# Patient Record
Sex: MALE | Race: WHITE
[De-identification: names, ages, dates, MRNs, and addresses within clinical notes are randomized per-mention and may not be internally consistent; named-entity substitution may affect disease eponyms.]

---

## 2018-04-13 ENCOUNTER — HOSPITAL ENCOUNTER (INPATIENT)
Dept: HOSPITAL 47 - EC | Age: 28
LOS: 10 days | Discharge: HOME | DRG: 885 | End: 2018-04-23
Attending: PSYCHIATRY & NEUROLOGY | Admitting: PSYCHIATRY & NEUROLOGY
Payer: MEDICARE

## 2018-04-13 DIAGNOSIS — R94.6: ICD-10-CM

## 2018-04-13 DIAGNOSIS — F25.1: Primary | ICD-10-CM

## 2018-04-13 DIAGNOSIS — R94.5: ICD-10-CM

## 2018-04-13 DIAGNOSIS — M41.9: ICD-10-CM

## 2018-04-13 DIAGNOSIS — G47.00: ICD-10-CM

## 2018-04-13 DIAGNOSIS — F43.22: ICD-10-CM

## 2018-04-13 DIAGNOSIS — F17.200: ICD-10-CM

## 2018-04-13 DIAGNOSIS — Z79.899: ICD-10-CM

## 2018-04-13 DIAGNOSIS — Z91.19: ICD-10-CM

## 2018-04-13 LAB
BILIRUB UR QL STRIP.AUTO: (no result)
HYALINE CASTS UR QL AUTO: 2 /LPF (ref 0–2)
KETONES UR QL STRIP.AUTO: (no result)
PH UR: 5.5 [PH] (ref 5–8)
PROT UR QL: (no result)
RBC UR QL: 1 /HPF (ref 0–5)
SP GR UR: 1.03 (ref 1–1.03)
UROBILINOGEN UR QL STRIP: 3 MG/DL (ref ?–2)
WBC #/AREA URNS HPF: 1 /HPF (ref 0–5)

## 2018-04-13 PROCEDURE — 80053 COMPREHEN METABOLIC PANEL: CPT

## 2018-04-13 PROCEDURE — 80306 DRUG TEST PRSMV INSTRMNT: CPT

## 2018-04-13 PROCEDURE — 84439 ASSAY OF FREE THYROXINE: CPT

## 2018-04-13 PROCEDURE — 81001 URINALYSIS AUTO W/SCOPE: CPT

## 2018-04-13 PROCEDURE — 99285 EMERGENCY DEPT VISIT HI MDM: CPT

## 2018-04-13 PROCEDURE — 85025 COMPLETE CBC W/AUTO DIFF WBC: CPT

## 2018-04-13 PROCEDURE — 83036 HEMOGLOBIN GLYCOSYLATED A1C: CPT

## 2018-04-13 PROCEDURE — 80061 LIPID PANEL: CPT

## 2018-04-13 PROCEDURE — 82075 ASSAY OF BREATH ETHANOL: CPT

## 2018-04-13 PROCEDURE — 84443 ASSAY THYROID STIM HORMONE: CPT

## 2018-04-13 RX ADMIN — NICOTINE SCH PATCH: 14 PATCH, EXTENDED RELEASE TRANSDERMAL at 18:10

## 2018-04-13 NOTE — ED
General Adult HPI





- General


Chief complaint: Psychiatric Symptoms


Stated complaint: PETITIONED


Time Seen by Provider: 04/13/18 13:22


Source: patient, police, RN notes reviewed, old records reviewed


Mode of arrival: ambulatory


Limitations: no limitations





- History of Present Illness


Initial comments: 





This is a 19-year-old male the ER for evaluation.  Patient is sent in for 

evaluation under court order for psychiatric evaluation.  Patient is a poor 

historian





- Related Data


 Home Medications











 Medication  Instructions  Recorded  Confirmed


 


ARIPiprazole [Abilify] 10 mg PO DAILY 04/13/18 04/13/18


 


Pyridoxine [Vitamin B-6] 50 mg PO DAILY 04/13/18 04/13/18











 Allergies











Allergy/AdvReac Type Severity Reaction Status Date / Time


 


No Known Allergies Allergy   Verified 04/13/18 13:44














Review of Systems


ROS Statement: 


Those systems with pertinent positive or pertinent negative responses have been 

documented in the HPI.





ROS Other: All systems not noted in ROS Statement are negative.





Past Medical History


Past Medical History: Memory Impairment


Additional Past Medical History / Comment(s): psychosis, alt mental status, 

scoliosis


History of Any Multi-Drug Resistant Organisms: None Reported


Past Surgical History: No Surgical Hx Reported


Past Anesthesia/Blood Transfusion Reactions: No Reported Reaction


Past Psychological History: Anxiety, Schizoaffective Disorder


Smoking Status: Current every day smoker


Past Alcohol Use History: Occasional


Past Drug Use History: None Reported





General Exam


Limitations: no limitations


General appearance: alert, in no apparent distress


Head exam: Present: atraumatic, normocephalic, normal inspection


Eye exam: Present: normal appearance, PERRL, EOMI.  Absent: scleral icterus, 

conjunctival injection, periorbital swelling


ENT exam: Present: normal exam, mucous membranes moist


Neck exam: Present: normal inspection.  Absent: tenderness, meningismus, 

lymphadenopathy


Respiratory exam: Present: normal lung sounds bilaterally.  Absent: respiratory 

distress, wheezes, rales, rhonchi, stridor


Cardiovascular Exam: Present: normal rhythm, tachycardia, normal heart sounds.  

Absent: systolic murmur, diastolic murmur, rubs, gallop, clicks


GI/Abdominal exam: Present: soft, normal bowel sounds.  Absent: distended, 

tenderness, guarding, rebound, rigid


Extremities exam: Present: normal inspection, full ROM, normal capillary 

refill.  Absent: tenderness, pedal edema, joint swelling, calf tenderness


Back exam: Present: normal inspection


Neurological exam: Present: alert, oriented X3, CN II-XII intact


Psychiatric exam: Present: normal affect, normal mood


Skin exam: Present: warm, dry, intact, normal color.  Absent: rash





Course


 Vital Signs











  04/13/18 04/13/18





  13:27 15:32


 


Temperature 98.5 F 


 


Pulse Rate 120 H 100


 


Respiratory 18 18





Rate  


 


Blood Pressure 142/87 131/66


 


O2 Sat by Pulse 98 98





Oximetry  














- Reevaluation(s)


Reevaluation #1: 





04/13/18 14:26


Medically clear for psychiatric evaluation





Medical Decision Making





- Medical Decision Making





19 female seen and evaluated with psychiatry will admit for psychiatric 

evaluation and treatment





- Lab Data


 Lab Results











  04/13/18 04/13/18 Range/Units





  13:38 13:38 


 


Urine Color   Yellow  


 


Urine Appearance   Clear  (Clear)  


 


Urine pH   5.5  (5.0-8.0)  


 


Ur Specific Gravity   1.031  (1.001-1.035)  


 


Urine Protein   1+ H  (Negative)  


 


Urine Glucose (UA)   Negative  (Negative)  


 


Urine Ketones   4+ H  (Negative)  


 


Urine Blood   Negative  (Negative)  


 


Urine Nitrite   Negative  (Negative)  


 


Urine Bilirubin   1+ H  (Negative)  


 


Urine Urobilinogen   3.0  (<2.0)  mg/dL


 


Ur Leukocyte Esterase   Negative  (Negative)  


 


Urine RBC   1  (0-5)  /hpf


 


Urine WBC   1  (0-5)  /hpf


 


Urine Bacteria   Rare H  (None)  /hpf


 


Hyaline Casts   2  (0-2)  /lpf


 


Urine Mucus   Many H  (None)  /hpf


 


Urine Opiates Screen  Not Detected   (NotDetected)  


 


Ur Oxycodone Screen  Not Detected   (NotDetected)  


 


Urine Methadone Screen  Not Detected   (NotDetected)  


 


Ur Propoxyphene Screen  Not Detected   (NotDetected)  


 


Ur Barbiturates Screen  Not Detected   (NotDetected)  


 


U Tricyclic Antidepress  Not Detected   (NotDetected)  


 


Ur Phencyclidine Scrn  Not Detected   (NotDetected)  


 


Ur Amphetamines Screen  Not Detected   (NotDetected)  


 


U Methamphetamines Scrn  Not Detected   (NotDetected)  


 


U Benzodiazepines Scrn  Not Detected   (NotDetected)  


 


Urine Cocaine Screen  Not Detected   (NotDetected)  


 


U Marijuana (THC) Screen  Not Detected   (NotDetected)  














Disposition


Clinical Impression: 


 Acute anxiety, Depression, Adjustment reaction, Chronic schizophrenia





Disposition: TRANSFER TO PSYCH HOSP/UNIT


Condition: Fair

## 2018-04-14 LAB
ALBUMIN SERPL-MCNC: 5 G/DL (ref 3.5–5)
ALP SERPL-CCNC: 56 U/L (ref 38–126)
ALT SERPL-CCNC: 36 U/L (ref 21–72)
ANION GAP SERPL CALC-SCNC: 15 MMOL/L
AST SERPL-CCNC: 42 U/L (ref 17–59)
BASOPHILS # BLD MANUAL: 0.08 K/UL (ref 0–0.2)
BUN SERPL-SCNC: 11 MG/DL (ref 9–20)
CALCIUM SPEC-MCNC: 9.8 MG/DL (ref 8.4–10.2)
CELLS COUNTED: 100
CHLORIDE SERPL-SCNC: 96 MMOL/L (ref 98–107)
CHOLEST SERPL-MCNC: 141 MG/DL (ref ?–200)
CO2 SERPL-SCNC: 29 MMOL/L (ref 22–30)
EOSINOPHIL # BLD MANUAL: 0.38 K/UL (ref 0–0.7)
ERYTHROCYTE [DISTWIDTH] IN BLOOD BY AUTOMATED COUNT: 5.62 M/UL (ref 4.3–5.9)
ERYTHROCYTE [DISTWIDTH] IN BLOOD: 11.7 % (ref 11.5–15.5)
GLUCOSE SERPL-MCNC: 104 MG/DL (ref 74–99)
HBA1C MFR BLD: 4.7 % (ref 4–6)
HCT VFR BLD AUTO: 46.8 % (ref 39–53)
HDLC SERPL-MCNC: 52 MG/DL (ref 40–60)
HGB BLD-MCNC: 16.7 GM/DL (ref 13–17.5)
LDLC SERPL CALC-MCNC: 76 MG/DL (ref 0–99)
LYMPHOCYTES # BLD MANUAL: 1.75 K/UL (ref 1–4.8)
MCH RBC QN AUTO: 29.7 PG (ref 25–35)
MCHC RBC AUTO-ENTMCNC: 35.7 G/DL (ref 31–37)
MCV RBC AUTO: 83.2 FL (ref 80–100)
MONOCYTES # BLD MANUAL: 0.68 K/UL (ref 0–1)
NEUTROPHILS NFR BLD MANUAL: 62 %
NEUTS SEG # BLD MANUAL: 4.71 K/UL (ref 1.3–7.7)
PLATELET # BLD AUTO: 202 K/UL (ref 150–450)
POTASSIUM SERPL-SCNC: 3.5 MMOL/L (ref 3.5–5.1)
PROT SERPL-MCNC: 7.9 G/DL (ref 6.3–8.2)
SODIUM SERPL-SCNC: 140 MMOL/L (ref 137–145)
TRIGL SERPL-MCNC: 63 MG/DL (ref ?–150)
WBC # BLD AUTO: 7.6 K/UL (ref 3.8–10.6)

## 2018-04-14 RX ADMIN — NICOTINE SCH: 14 PATCH, EXTENDED RELEASE TRANSDERMAL at 09:49

## 2018-04-14 RX ADMIN — NICOTINE STA PATCH: 7 PATCH, EXTENDED RELEASE TRANSDERMAL at 17:38

## 2018-04-14 RX ADMIN — NICOTINE STA: 7 PATCH, EXTENDED RELEASE TRANSDERMAL at 18:58

## 2018-04-14 NOTE — HP
HISTORY AND PHYSICAL



DATE OF SERVICE:

04/14/2018.



IDENTIFYING DATA:

This patient is a 27-year-old single  male who was admitted to the mental

health unit on a pickup order for acute symptoms of psychosis.



HISTORY OF PRESENT ILLNESS:

The patient presents with a petition completed by his mother stating "On Wednesday,

Girma told me he had quit taking his meds around Thanksgiving because they do not make

him feel good and because he is fine and does not need any medications.  He is

currently acting very paranoid, not communicating with friends and family.  I went to

Girma's yesterday.  He would not let me in his home.  He was very standoffish and asked

me to leave.  He also asked his girlfriend to leave the home and go to her mother's.

Girma acknowledged to his girlfriend he was hearing voices."  The patient is found in

the hallway.  He approaches me.  He follows me to an interview room.  He has difficulty

explaining how he got here to the hospital, but ultimately recalls that the police

brought him.  He has no insight as to why he is here and would like to be released.  He

indicates he has been off of his Abilify, but provides different time frames regarding

this noncompliance.  He states he has been off of it for only 1 month, but the

documentation suggests much longer.  He is endorsing some symptoms such as insomnia,

feeling tired, decreased appetite with recent weight loss.  He endorses having frequent

tearfulness, but does not explain why.  He endorses intermittent anxiety symptoms.  He

reports no auditory or visual hallucinations.  He is reporting no specific delusions as

we reviewed several types.  It is obvious that he is experiencing symptoms of psychosis

and is minimizing symptoms.  He reports no thoughts of self-harm or harm to others.  He

reports residing with his girlfriend and states there are no firearms in their home.



PAST PSYCHIATRIC HISTORY:

He states this is his 3rd inpatient psychiatric admission.  He endorses a suicide

attempt back in 2008, but does not describe it.  He states he has been working with Dr. Hidalgo but the timeline again is uncertain.  It appears he has been on Abilify 10 mg

daily when he was compliant.  He states the longest period he was compliant with that

medication was 2 years.  He may have taken Lexapro and Risperdal in the past.



PAST MEDICAL HISTORY:

None reported.



ALLERGIES:

No known drug allergies.



CHEMICAL DEPENDENCY HISTORY:

He reports using 1 alcoholic drink once a month.  He denies any use of marijuana or any

other illicit drugs.  He states he has never been placed in residential treatment for

chemical dependency reasons.



FAMILY PSYCHIATRIC HISTORY:

Uncertain mental illness symptoms actually with his uncle and grandfather.  No

completed suicides in the family.



FAMILY CHEMICAL DEPENDENCY HISTORY:

Unknown.



LEGAL HISTORY:

None reported.



ABUSE HISTORY:

Unknown.



SOCIAL HISTORY:

The patient is 27 years old.  He is single but does have a girlfriend of approximately

2 years.  He states they reside together in [place].  He has no children.

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

lost his job.  He has a high school education with some college credits from [employer].  No history of  service.  He has 1 sister.  He

is originally from [place], [place] and resides in [place] currently.



MENTAL STATUS EXAM:

The patient is a thin  male.  He has a visible tattoo in his right upper

extremity.  He is prematurely gray in terms of hair color.  Eye contact is

intermittent.  He will often pause speech to look around the room.  He obviously

demonstrates thought blocking.  He has difficulty responding to questions at a normal

pace.  He is guarded and suspicious at times.  He asked why I am asking certain

questions and what my real intention may be.  He is reporting no suicidal or homicidal

thoughts.  He is endorsing no specific delusions, but this is likely not a reliable

report.  He is endorsing no hallucinations and again it appears that he may be

responding to some type of hallucination.  Insight and judgment are impaired.  He

struggles with any cognitive testing today.  He demonstrates no verbal or physical

aggressiveness.  In fact he demonstrates psychomotor slowing.  He demonstrates no

abnormal involuntary movements.



IMPRESSIONS:

Schizoaffective disorder.  Rule out depressed type.



PLAN:

The patient has been admitted to the mental health unit on a petition and clinical

certificate.  He does not appreciate the therapeutic reasons for being admitted to the

mental health unit.  Therefore, I am completing a 2nd clinical certificate.  He

eventually agrees to let me restart the Abilify 10 mg daily.  We will monitor him for

safety and encourage his participation in the milieu.  He will be seen by internal

medicine for routine history and physical exam.  Social Work will meet with the patient

to complete a psychosocial assessment.  Vital signs reviewed. Lab results reviewed.

His urine drug screen was negative.



MMODL / IJN: [id number] / Job#: [id number]

## 2018-04-14 NOTE — P.CONS
History of Present Illness





- Reason for Consult


Evaluated for hypothyroidism





- History of Present Illness


19-year-old admitted for acute psychosis.  Patient denied any symptoms except 

for a low back discomfort patient denied any fever chills nausea vomiting.  

Patient does have scoliosis.  Patient is bit tachycardic denied any significant 

sleep problems mild low TSH will obtain T4 levels.  If T4 levels are within 

normal limits and recommend reevaluation of thyroid function as an outpatient 

in about a month








Review of Systems


REVIEW OF SYSTEMS: 


CONSTITUTIONAL: No fever, no malaise, no fatigue. 


HEENT: No recent visual problems or hearing problems. Denied any sore throat. 


CARDIOVASCULAR: No chest pain, orthopnea, PND, no palpitations, no syncope. 


PULMONARY: No shortness of breath, no cough, no hemoptysis. 


GASTROINTESTINAL: No diarrhea, no nausea, no vomiting, no abdominal pain. 

Normoactive bowel sounds. 


NEUROLOGICAL: No headaches, no weakness, no numbness. 


HEMATOLOGICAL: Denies any bleeding or petechiae. 


GENITOURINARY: Denies any burning micturition, frequency, or urgency. 


MUSCULOSKELETAL/RHEUMATOLOGICAL: Denies any joint pain, swelling, or any muscle 

pain. 


ENDOCRINE: Denies any polyuria or polydipsia. 





The rest of the 14-point review of systems is negative.











Past Medical History


Past Medical History: Memory Impairment


Additional Past Medical History / Comment(s): psychosis, alt mental status, 

scoliosis


History of Any Multi-Drug Resistant Organisms: None Reported


Past Surgical History: No Surgical Hx Reported


Past Anesthesia/Blood Transfusion Reactions: No Reported Reaction


Smoking Status: Current every day smoker





Medications and Allergies


 Home Medications











 Medication  Instructions  Recorded  Confirmed  Type


 


ARIPiprazole [Abilify] 10 mg PO DAILY 04/13/18 04/13/18 History


 


Pyridoxine [Vitamin B-6] 50 mg PO DAILY 04/13/18 04/13/18 History











 Allergies











Allergy/AdvReac Type Severity Reaction Status Date / Time


 


No Known Allergies Allergy   Verified 04/13/18 20:59














Physical Exam


Vitals: 


 Vital Signs











  Temp Pulse Resp BP


 


 04/14/18 00:45  97.7 F  105 H  18  130/83











PHYSICAL EXAMINATION: 





GENERAL: The patient is alert and oriented x3, not in any acute distress. Well 

developed, well nourished. 


HEENT: Pupils are round and equally reacting to light. EOMI. No scleral 

icterus. No conjunctival pallor. Normocephalic, atraumatic. No pharyngeal 

erythema. No thyromegaly. 


CARDIOVASCULAR: S1 and S2 present. No murmurs, rubs, or gallops. 


PULMONARY: Chest is clear to auscultation, no wheezing or crackles. 


ABDOMEN: Soft, nontender, nondistended, normoactive bowel sounds. No palpable 

organomegaly. 


MUSCULOSKELETAL: No joint swelling or deformity.


EXTREMITIES: No cyanosis, clubbing, or pedal edema. 


NEUROLOGICAL: Gross neurological examination did not reveal any focal deficits. 


SKIN: No rashes. 











Results


CBC & Chem 7: 


 04/14/18 08:10





 04/14/18 08:10


Labs: 


 Abnormal Lab Results - Last 24 Hours (Table)











  04/14/18 Range/Units





  08:10 


 


Chloride  96 L  ()  mmol/L


 


Glucose  104 H  (74-99)  mg/dL


 


Total Bilirubin  1.8 H  (0.2-1.3)  mg/dL


 


TSH  0.460 L  (0.465-4.680)  mIU/L














Assessment and Plan


Plan: 


-Mildly low TSH: Further management as mentioned above


-Mildly elevated bilirubin no further intervention is necessary mild elevations 

are not uncommon once repeat the test it can come back as normal.


-Acute psychosis management as per primary service

## 2018-04-15 RX ADMIN — NICOTINE SCH PATCH: 7 PATCH, EXTENDED RELEASE TRANSDERMAL at 14:37

## 2018-04-15 RX ADMIN — NICOTINE SCH: 7 PATCH, EXTENDED RELEASE TRANSDERMAL at 09:27

## 2018-04-15 NOTE — P.PN
Progress Note - Text





Interval history: The patient is found in the hallway he follows me to an 

interview room.  He reports that his mood is better than yesterday.  He states 

that his vision has become more clear.  He states that he is able to think more 

clearly as well.  He indicates he slept 6 hours last night staff reported that 

he slept 3 hours and was restless.  He states his appetite had been impaired 

but it's improved today.  He plans on attending groups today.  He expects he 

may have some visits this evening.  He has no questions or concerns regarding 

the Abilify.





Mental status exam: The patient is a thin  male he seated calmly in 

the chair.  He is dressed in his own clothing.  Eye contacts is intermittent.  

Speech is spontaneous fluent.  He does demonstrate some mild psychomotor 

slowing at times.  He is endorsing no symptoms of psychosis but they appear to 

persist in observing his behavior.  He demonstrates no verbal or physical 

aggressiveness.  He demonstrates no abnormal involuntary movements.  Insight 

and judgment remain limited.  He is oriented to person place and date.  He is 

directable during this session and is pleasant.





Plan: The patient will continue on the Abilify as written.  We will monitor him 

for safety and encourage full participation in the milieu.  Vital signs 

reviewed.

## 2018-04-16 RX ADMIN — NICOTINE SCH: 7 PATCH, EXTENDED RELEASE TRANSDERMAL at 07:59

## 2018-04-16 NOTE — P.PN
Progress Note - Text


Progress Note Date: 04/16/18





Interval History: Patient is a 27-year-old male who was seen today and he told 

me that he is thinking is clear.  Patient states he stopped his medication in 

October because he thought he was becoming tolerant to the Abilify.  He states 

he been taking long-acting Abilify injections in the past but stopped those and 

started oral medication.  Thought his parents were causing him harm, and states 

that he thinks most of his difficulties began when he is depressed in high 

school and started on Effexor and abruptly discontinued it.  Patient was unable 

to understand why his parents petitioned him, was unable to understand the 

involuntary process when I reviewed it with him.  Patient states that if I ask 

him a question he has no verbal thoughts in his head, he states that he can't 

hear his own thoughts and states that it was noisy before his eyes began the 

interview.  He states he is unable to tell me why his girlfriend was afraid of 

him.  He states that there have been struggles at home with a girlfriend and 

its due to miscommunication in their conversations.  He reported that the 

energy in groups here from makes him feel boxed in and so he needs to leave.





Mental Status: Appearance/Attitude: Patient is appropriately dressed, makes 

intermittent eye contact and is cooperative


Behavior: Patient did not display any psychomotor agitation or retardation 

however did need to get up with the end of the interview and begin pacing in 

the room


Speech/Language: Patient was spontaneous, speech is of normal volume and rhythm 

and he was coherent


Thought Process: Patient's responses to some questions were goal directed at 

other times his responses were disjointed, not goal-directed and he had 

difficulty organizing his thoughts to respond


Thought Content: Patient denied auditory or visual hallucinations, patient 

denied that he was feeling paranoid and no other delusions were elicited.  

Patient states that he is feeling boxed in by the energy and group and this is 

why he leaves the groups at times.  He states that he doesn't understand why he 

is in the hospital and states that he stopped the medication because he thought 

he was becoming tolerant to it.  Patient states he's been a Bible to work for 

the last 3 weeks but is unable to explain to me why.


Suicidal/Homicidal Ideation: Patient denies any current suicidal or homicidal 

ideation


Sensorium/Cognition: Patient is alert and oriented to person, place, and time 

and his recent and remote memory are grossly intact


Mood/Affect: Patient's mood is guarded and his affect is blunted


Insight/Judgment: Patient's insight and judgment are impaired





Assessment: Patient was agreeable to taking oral Abilify and was begun on 10 

mg.  Patient reports that his thinking is clearer however the patient's 

responses to questions are not goal-directed at times and/or disjointed and at 

times the patient appears guarded and suspicious.  Patient is denying that he's 

having any suicidal thoughts.  Patient states he is unaware of why his 

girlfriend is afraid of him and denies that he is having hallucinations, but 

states that he can't hear his own thoughts.  Patient was reported by staff to 

be religiously preoccupied in group therapy making comments about the Bible, 

the serenity prayer wasn't in the Bible.  I explained the involuntary process 

several times to the patient and he continues to question why he was admitted 

involuntarily, stating that he did not think he needed the medication.





Plan: Patient will continue on Abilify 10 mg, will continue to titrate the 

medication to target his symptoms of psychosis.  Patient was encouraged to 

attend groups and activities.  Patient continues to require hospitalization to 

further stabilize his psychotic symptoms.  Patient's free T4 was within normal 

limits.

## 2018-04-17 VITALS — RESPIRATION RATE: 16 BRPM

## 2018-04-17 RX ADMIN — NICOTINE SCH: 7 PATCH, EXTENDED RELEASE TRANSDERMAL at 09:20

## 2018-04-17 NOTE — P.PN
Progress Note - Text


Progress Note Date: 04/17/18





Interval History: Patient is a 27-year-old male who was seen today who reported 

that he is angry and irritable about being in the hospital, continues to 

question why he needs the medication.  Patient states that he attends some 

groups.  He reported to me that his mother's signature looked photocopied on 

the petition and he could not understand why she would've filled out a hospital 

form.  He states that he also could not read his legal documents because they 

were blurred.  He reported that he felt like he was going to be here forever.  

When discussing medication the patient refused an increase in his Abilify as 

well as refusing long-acting injectable.  Patient states that he felt like he 

slept for 8 hours last evening and feels rested this morning.  Patient reports 

that he had decided to stop his medications and hadn't been on them for 5 

months and had been doing quite well.  He then wondered if he would continue to 

have a job or not once he is discharged.





Mental Status: Appearance/Attitude: Patient is appropriately dressed, makes 

intermittent eye contact, at times gets up and paces in the office and looks at 

himself in the mirror and is superficially cooperative


Behavior: Patient does not exhibit any psychomotor agitation or retardation.


Speech/Language: Patient's speech is spontaneous and normal volume and rhythm 

and he is coherent


Thought Process: Patient is goal-directed, no evidence of loose association or 

flight of ideas


Thought Content: Patient denies auditory or visual hallucinations, patient did 

express that he thought his mother's signature would been photocopied on forms 

questioned why his mother would've been writing on a hospital document and 

states he can't read them because they're blurry.  Patient is ambivalent and 

argumentative when it comes to discussing his medications questioning how he is 

doing right this moment.  Patient states that he discontinued his medications 5 

months ago because he did not feel he needs them.  He states he is angry and 

irritable about being in the hospital and feels he'll be here forever.  Patient 

continues to question the need for medication and states that he is been 

sleeping and eating well.


Suicidal/Homicidal Ideation: Patient denies current suicidal or homicidal 

ideation


Sensorium/Cognition: Patient is alert and oriented to person, place, and time 

and his recent and remote memory are grossly intact


Mood/Affect:  patient's mood is guarded and his affect is blunted


Insight/Judgment: Patient's insight and judgment are limited





Assessment: Patient remains delusional today verbalizing concerns about whether 

his mother's signature was photocopied and questioning why she would have 

filled out a hospital form.  Patient remains argumentative and ambivalent about 

taking medication and questions the need for medication stating that he had 

discontinued at 5 months ago.  He is questioning whether he may have a job to 

return to her not.  Patient states he's been attending some groups.  Patient 

discussed his upcoming deferral on the 19th and hearing on the 27th stating 

that he thought he may defer.





Plan: Patient continues on Abilify 10 mg daily he refused to have the 

medication increased.  Patient continues to require hospitalization to further 

stabilize his psychotic symptoms.

## 2018-04-18 RX ADMIN — Medication SCH MG: at 22:23

## 2018-04-18 RX ADMIN — NICOTINE SCH PATCH: 7 PATCH, EXTENDED RELEASE TRANSDERMAL at 08:42

## 2018-04-18 NOTE — P.PN
Progress Note - Text


Progress Note Date: 04/18/18





Interval History: Patient is a 27-year-old male who states that he is feeling 

slightly drowsy this morning because he took Ativan last night to help him 

sleep.  Patient states that he's been trying to attend groups but has to leave 

at times because of the "energy I feel in the groups from the way people are 

sitting, talking".  Patient stated that he had spoken to his former girlfriend, 

then described her as "the light in my life".  Patient becomes quite guarded 

when asking about his relationship with his girlfriend and why he referred to 

her wrists his former girlfriend.  Patient denies auditory hallucinations.  He 

again needed to get up and paced during the interview.





Mental Status: Appearance/Attitude: Patient is dressed in pajama bottoms and a T

-shirt, makes intermittent eye contact and is cooperative


Behavior: Patient does not exhibit any psychomotor agitation or retardation, 

but again needed to get up and paced during the interview


Speech/Language: Patient's speech is spontaneous of normal volume and rhythm 

and he is coherent


Thought Process: Patient responds to questions in a  vague and circumstantial 

fashion


Thought Content: Patient denies auditory or visual hallucinations no delusions 

or paranoid ideation or elicited.  Patient however remains guarded, stated 

today that he feels energy in the groups and needs to leave at times because it 

is negative.  Patient reports that he took Ativan to assist with sleep last 

night but feels drowsy this morning.  He states he tries to attend the groups 

but due to this energy that he feels he needs to leave at times.


Suicidal/Homicidal Ideation: Patient denies any current suicidal or homicidal 

ideation


Sensorium/Cognition: Patient is alert and oriented to person, place, and time 

and his recent and remote memory are grossly intact


Mood/Affect: Patient's mood remains guarded and his affect restricted


Insight/Judgment: Patient's insight and judgment are impaired





Assessment: Patient remains guarded, today discussing that he tries to attend 

groups but the energy that he feels from the others in the groups causes him to 

leave at times when it is negative in quality.  Patient remains circumstantial 

and vague when responding to questions, makes intermittent eye contact and 

again needed to get up and paced during the interview.  Patient declined to 

have his Abilify increased at this time stating he'll wait for the deferral 

tomorrow.  He discussed his former girlfriend and then stated that she was the 

light in his life but again became suspicious when I asked him about their 

relationship.  Patient goes in and out of groups, not able to stay in any group 

for the complete length of time.





Plan: Patient will continue on Abilify 10 mg, his deferral is tomorrow at 3 PM.

  Patient is requesting a change in outpatient treatment on discharge.  Patient 

continues to require hospitalization to further target his psychotic symptoms.

## 2018-04-19 RX ADMIN — Medication SCH MG: at 22:06

## 2018-04-19 RX ADMIN — NICOTINE SCH: 7 PATCH, EXTENDED RELEASE TRANSDERMAL at 08:45

## 2018-04-19 NOTE — P.PN
Progress Note - Text


Progress Note Date: 04/19/18





Interval History: Patient is a 27-year-old male who was seen this morning and 

he reports that he woke up several times last night but was able to return to 

sleep and that the melatonin did help him fall asleep.  Patient feels that his 

mood has been more stable.  He reports no auditory or visual hallucinations.  

He states that he is not feeling suicidal.  Patient and I discussed his 

medications and he was agreeable to taking the long-acting injectable Abilify.  

Patient was less guarded or in the interview and was not pacing during the 

interview.  Patient did request that he not return to his prior outpatient 

treatment.





Mental Status: Appearance/Attitude: Patient is casually dressed, made better 

eye contact and was cooperative.


Behavior: Patient did not exhibit any psychomotor agitation or retardation.  

Patient was able to sit quietly during the interview without pacing


Speech/Language: Patient's speech was spontaneous of normal volume and rhythm 

and he was coherent.


Thought Process: Patient was goal-directed and was less circumstantial no 

evidence of loose association or flight of ideas


Thought Content: Patient denied auditory or visual hallucinations and no 

paranoid ideation or delusional ideation was elicited.  Patient was less 

tangential and able to discuss his medications without any ambivalence or vague 

answers.  He was agreeable to continue with the Abilify and was agreeable to 

taking the long-acting injectable medication.  Patient states that his mood was 

more stable.  He reports his sleep was interrupted last evening but he was able 

to return to sleep and felt rested this morning and he states that his appetite 

is good.


Suicidal/Homicidal Ideation: Patient denies any current suicidal or homicidal 

ideation


Sensorium/Cognition: Patient is alert and oriented to person, place, and time 

and his recent and remote memory are intact


Mood/Affect: Patient's mood is less guarded and his affect is slightly blunted


Insight/Judgment: Patient's insight and judgment are improving





Assessment: Patient and I discussed his medications and he was agreeable to 

taking the Abilify long-acting injectable at 300 mg, patient reports that his 

mood is more stable.  He felt that the melatonin had been helpful in his sleep 

last evening.  Patient was less guarded during the interview and was not is 

ambivalent about medication nor is vague and circumstantial in his responses as 

he has been in the past.  Patient was able to sit quietly during the interview 

without pacing.  Patient attends some groups and activities.  Patient is 

requesting a different outpatient follow-up referral on discharge.





Plan: Patient will continue on Abilify 10 mg for 14 more days and will receive 

Abilify Maintenna 300 mg IM today.  Patient will also continue on melatonin 3 

mg at bedtime.  Patient continues to require hospitalization to further 

stabilize his psychotic symptoms.

## 2018-04-20 RX ADMIN — NICOTINE SCH PATCH: 7 PATCH, EXTENDED RELEASE TRANSDERMAL at 10:57

## 2018-04-20 RX ADMIN — Medication SCH MG: at 21:51

## 2018-04-20 RX ADMIN — NICOTINE SCH: 7 PATCH, EXTENDED RELEASE TRANSDERMAL at 08:56

## 2018-04-20 NOTE — P.PN
Progress Note - Text


Progress Note Date: 04/20/18





Interval History: Patient is a 27-year-old male who was seen today and he 

discussed that he would need to be living with his parents after discharge as 

his girlfriend has moved her things out of the apartment and the lease expires 

in 2 months.  Patient states that he suspects he will also need to look for 

another job when he is discharged.  Patient reports that he is not eager to 

live with his parents because he states that he really doesn't get along well 

with them.  Patient reports that he feels his thinking is clear and that he is 

doing better on the medication and sees the benefits of the medication.  

Patient states that he did defer yesterday when he met with the .





Mental Status: Appearance/Attitude: Patient is casually dressed, makes good eye 

contact and is cooperative.


Behavior: Patient does not exhibit any psychomotor agitation or retardation was 

able to sit quietly during the interview.


Speech/Language: Patient's speech is spontaneous and normal volume and rhythm 

and he is coherent.


Thought Process: Patient is goal-directed there is no evidence of loose 

association or flight of ideas and he is not circumstantial or tangential


Thought Content: Patient denies auditory or visual hallucinations no delusions 

or paranoid ideation were elicited.  Patient states that his thinking is much 

clearer he admitted that he is no longer feeling suspicious.  Patient states 

that he is sleeping well and his appetite is good.  Patient discussed that he 

will not be returning to the apartment that he and his girlfriend were living 

in and she is moved her things out as well as the fact that he will probably 

have lost his job when he is discharged.  Patient reported no side effects from 

his medication


Suicidal/Homicidal Ideation: Patient denied any current suicidal or homicidal 

ideation


Sensorium/Cognition: Patient is alert and oriented to person, place, and time 

and his recent and remote memory are grossly intact


Mood/Affect: Patient's mood is less guarded and his affect is slightly blunted


Insight/Judgment: Patient's insight and judgment are improving





Assessment: Patient discussed his girlfriend moving out, they're not living 

together anymore and losing the apartment in 2 months as well as probably 

losing his job and did not report feeling distressed by this information.  He 

reports that he is not happy to return to live with his parents but we'll plan 

on looking for a job and trying to find his own place soon.  Patient reports no 

side effects from the medication and does feel that the medication has been 

beneficial and reports that his thinking is much clearer.  Patient has been 

attending groups and activities and not walking out.  Patient was able to sit 

quietly during the interview and is much more focused and organized in his 

thinking and response to questions.





Plan: Patient continue on Abilify 10 mg orally and he received Abilify 

Maintenna 300 mg on April 19.  Patient and I discussed his discharge for Monday

, he is requesting different follow-up after discharge.

## 2018-04-21 RX ADMIN — Medication SCH MG: at 21:41

## 2018-04-21 RX ADMIN — NICOTINE SCH PATCH: 7 PATCH, EXTENDED RELEASE TRANSDERMAL at 09:06

## 2018-04-21 NOTE — P.PN
Progress Note - Text


Progress Note Date: 04/21/18





Interval history: Patient is seen in [facility] today.  He describes that 

his parents are coming to visit tonight.  He does not voice any adverse 

psychotropic medication side effects.  He does talk about discharge planning 

for Monday.





Mental status exam: He is alert and cooperative with the interview.  Speech is 

fluent, rapid or pressured.  Thought processes are organized.  He does not 

verbalize any thoughts of harm to self or others.  He does not voice any 

hallucinations, does not appear responding to any internal stimuli.  He does 

not make any lia delusional statements.  Mood currently appears to be stable.





Plan: Patient will be maintained on current psychotropic medication regimen.  We

'll monitor for any medication side effects monitor his ongoing response.  We'

ll continue to cover this patient through the weekend.

## 2018-04-22 RX ADMIN — NICOTINE SCH: 7 PATCH, EXTENDED RELEASE TRANSDERMAL at 08:43

## 2018-04-22 RX ADMIN — Medication SCH MG: at 22:30

## 2018-04-22 NOTE — P.PN
Progress Note - Text


Progress Note Date: 04/22/18





Interval history: Patient reports that his mood is doing pretty good.  He does 

talk about discharge plans for tomorrow.  He did a visit from his parents last 

night.  He also talks about looking at a family meeting tomorrow.  He does not 

seem to voice any adverse atrophic medication side effects.  He slept 

approximately 6 hours last night.





Mental status exam: He is alert and cooperative with the interview.  His speech 

is fluent, thought processes are organized.  He describes his mood as pretty 

good.  He denies any thoughts of harm to self or others.  He does not verbalize 

any hallucinations or delusional thoughts.  He does not show any agitation.





Plan: Patient will be maintained on current psychotropic medication regimen.  

Continue to monitor for any medication side effects and monitor his ongoing 

response.

## 2018-04-23 VITALS — HEART RATE: 95 BPM | DIASTOLIC BLOOD PRESSURE: 68 MMHG | SYSTOLIC BLOOD PRESSURE: 131 MMHG | TEMPERATURE: 97.8 F

## 2018-04-23 RX ADMIN — NICOTINE SCH: 7 PATCH, EXTENDED RELEASE TRANSDERMAL at 08:27

## 2018-04-23 NOTE — P.DS
Providers


Date of admission: 


04/13/18 15:13





Expected date of discharge: 04/23/18


Attending physician: 


Valerie Echavarria MD





Consults: 





 





04/13/18 15:22


Consult Physician Routine 


   Consulting Provider: Elvis Salazar


   Consult Reason/Comments: follow up H & P


   Do you want consulting provider notified?: Yes











Primary care physician: 


[facility] Course: 





Discharge Diagnosis: Schizoaffective disorder, depressed type





Reason for Admission: Patient is a 27-year-old male who was admitted to the 

mental health unit on a pickup order for symptoms of psychosis.  Patient's 

mother had completed the petition stating that the patient had stopped his 

medications at the end of November and that he did not feel he needed to 

continue taking them.  Patient was extremely paranoid and not communicating 

with friends or family.  He would not let his mother in the house and had asked 

his girlfriend to also move out of the house.  Patient was unable to explain 

how he got to the hospital and stated that he felt like he needed to be 

released and did not need to be taking medication.  Patient reported that he 

was feeling tired, with a decreased appetite and weight loss.  Patient denied 

any auditory or visual hallucinations on admission and denied any current 

suicidal or homicidal ideation.  Patient was extremely guarded and suspicious, 

questioning what the reason for questions were on admission.  He questioned the 

intention of the physician examining him.  Patient had evidence of psychomotor 

slowing on admission having difficulty responding to questions, difficulty with 

cognitive testing.





Hospital Course: Patient was admitted on an involuntary basis, he was placed on 

routine observation in group and activity therapy were ordered.  Patient also 

had routine laboratory studies as well as a medical consultation.  Patient was 

placed on Abilify 10 mg on admission and he was agreeable to take the 

medication.  Patient remained guarded and suspicious, he continued to feel that 

the medication was not necessary.  Patient did attend groups and activities but 

could not stay for more than a few minutes at a time and during interviews with 

him he would pace most of the time.  Patient did defer his hearing and at that 

time was given Abilify 300 mg long-acting injectable.  Patient did show 

improvement with a decrease in his suspiciousness, he was not pacing and was 

able to sit quietly during the interview.  He reported that his thoughts were 

much clearer and he felt that he was doing better on the medication.  Patient 

reported that he was aware that his girlfriend had moved back in with her 

parents, that he would be living with his parents on discharge and they 

probably had lost his job and would need to look for another one.  Patient 

stated that he was sleeping well on melatonin which was added to assist with 

his sleep.  Patient reported no side effects from the Abilify.  Patient reports 

that he was eating well, his sleep was restful he reported no paranoid ideation 

and no auditory hallucinations.  Patient reported that he felt much better on 

the medication and did agree that he needed to continue taking it.  Patient 

felt he was ready for discharge.








Allergies





No Known Allergies Allergy (Verified 04/13/18 20:59)


 











 Laboratory Last Values











WBC  7.6 k/uL (3.8-10.6)   04/14/18  08:10    


 


RBC  5.62 m/uL (4.30-5.90)   04/14/18  08:10    


 


Hgb  16.7 gm/dL (13.0-17.5)   04/14/18  08:10    


 


Hct  46.8 % (39.0-53.0)   04/14/18  08:10    


 


MCV  83.2 fL (80.0-100.0)   04/14/18  08:10    


 


MCH  29.7 pg (25.0-35.0)   04/14/18  08:10    


 


MCHC  35.7 g/dL (31.0-37.0)   04/14/18  08:10    


 


RDW  11.7 % (11.5-15.5)   04/14/18  08:10    


 


Plt Count  202 k/uL (150-450)   04/14/18  08:10    


 


Neutrophils % (Manual)  62 %  04/14/18  08:10    


 


Lymphocytes % (Manual)  23 %  04/14/18  08:10    


 


Monocytes % (Manual)  9 %  04/14/18  08:10    


 


Eosinophils % (Manual)  5 %  04/14/18  08:10    


 


Basophils % (Manual)  1 %  04/14/18  08:10    


 


Neutrophils # (Manual)  4.71 k/uL (1.3-7.7)   04/14/18  08:10    


 


Lymphocytes # (Manual)  1.75 k/uL (1.0-4.8)   04/14/18  08:10    


 


Monocytes # (Manual)  0.68 k/uL (0-1.0)   04/14/18  08:10    


 


Eosinophils # (Manual)  0.38 k/uL (0-0.7)   04/14/18  08:10    


 


Basophils # (Manual)  0.08 k/uL (0-0.2)   04/14/18  08:10    


 


Nucleated RBCs  0 /100 WBC (0-0)   04/14/18  08:10    


 


RBC Morphology  Normal   04/14/18  08:10    


 


Sodium  140 mmol/L (137-145)   04/14/18  08:10    


 


Potassium  3.5 mmol/L (3.5-5.1)   04/14/18  08:10    


 


Chloride  96 mmol/L ()  L  04/14/18  08:10    


 


Carbon Dioxide  29 mmol/L (22-30)   04/14/18  08:10    


 


Anion Gap  15 mmol/L  04/14/18  08:10    


 


BUN  11 mg/dL (9-20)   04/14/18  08:10    


 


Creatinine  0.76 mg/dL (0.66-1.25)   04/14/18  08:10    


 


Est GFR (CKD-EPI)AfAm  >90  (>60 ml/min/1.73 sqM)   04/14/18  08:10    


 


Est GFR (CKD-EPI)NonAf  >90  (>60 ml/min/1.73 sqM)   04/14/18  08:10    


 


Glucose  104 mg/dL (74-99)  H  04/14/18  08:10    


 


Estimated Ave Glu mg/dL  88   04/14/18  08:10    


 


Hemoglobin A1c  4.7 % (4.0-6.0)   04/14/18  08:10    


 


Calcium  9.8 mg/dL (8.4-10.2)   04/14/18  08:10    


 


Total Bilirubin  1.8 mg/dL (0.2-1.3)  H  04/14/18  08:10    


 


AST  42 U/L (17-59)   04/14/18  08:10    


 


ALT  36 U/L (21-72)   04/14/18  08:10    


 


Alkaline Phosphatase  56 U/L ()   04/14/18  08:10    


 


Total Protein  7.9 g/dL (6.3-8.2)   04/14/18  08:10    


 


Albumin  5.0 g/dL (3.5-5.0)   04/14/18  08:10    


 


Triglycerides  63 mg/dL (<150)   04/14/18  08:10    


 


Cholesterol  141 mg/dL (<200)   04/14/18  08:10    


 


LDL Cholesterol, Calc  76 mg/dL (0-99)   04/14/18  08:10    


 


HDL Cholesterol  52 mg/dL (40-60)   04/14/18  08:10    


 


TSH  0.460 mIU/L (0.465-4.680)  L  04/14/18  08:10    


 


Free T4  1.63 ng/dL (0.78-2.19)   04/14/18  08:10    


 


Urine Color  Yellow   04/13/18  13:38    


 


Urine Appearance  Clear  (Clear)   04/13/18  13:38    


 


Urine pH  5.5  (5.0-8.0)   04/13/18  13:38    


 


Ur Specific Gravity  1.031  (1.001-1.035)   04/13/18  13:38    


 


Urine Protein  1+  (Negative)  H  04/13/18  13:38    


 


Urine Glucose (UA)  Negative  (Negative)   04/13/18  13:38    


 


Urine Ketones  4+  (Negative)  H  04/13/18  13:38    


 


Urine Blood  Negative  (Negative)   04/13/18  13:38    


 


Urine Nitrite  Negative  (Negative)   04/13/18  13:38    


 


Urine Bilirubin  1+  (Negative)  H  04/13/18  13:38    


 


Urine Urobilinogen  3.0 mg/dL (<2.0)   04/13/18  13:38    


 


Ur Leukocyte Esterase  Negative  (Negative)   04/13/18  13:38    


 


Urine RBC  1 /hpf (0-5)   04/13/18  13:38    


 


Urine WBC  1 /hpf (0-5)   04/13/18  13:38    


 


Urine Bacteria  Rare /hpf (None)  H  04/13/18  13:38    


 


Hyaline Casts  2 /lpf (0-2)   04/13/18  13:38    


 


Urine Mucus  Many /hpf (None)  H  04/13/18  13:38    


 


Urine Opiates Screen  Not Detected  (NotDetected)   04/13/18  13:38    


 


Ur Oxycodone Screen  Not Detected  (NotDetected)   04/13/18  13:38    


 


Urine Methadone Screen  Not Detected  (NotDetected)   04/13/18  13:38    


 


Ur Propoxyphene Screen  Not Detected  (NotDetected)   04/13/18  13:38    


 


Ur Barbiturates Screen  Not Detected  (NotDetected)   04/13/18  13:38    


 


U Tricyclic Antidepress  Not Detected  (NotDetected)   04/13/18  13:38    


 


Ur Phencyclidine Scrn  Not Detected  (NotDetected)   04/13/18  13:38    


 


Ur Amphetamines Screen  Not Detected  (NotDetected)   04/13/18  13:38    


 


U Methamphetamines Scrn  Not Detected  (NotDetected)   04/13/18  13:38    


 


U Benzodiazepines Scrn  Not Detected  (NotDetected)   04/13/18  13:38    


 


Urine Cocaine Screen  Not Detected  (NotDetected)   04/13/18  13:38    


 


U Marijuana (THC) Screen  Not Detected  (NotDetected)   04/13/18  13:38    














Discharge Mental Status: Appearance/Attitude: Patient was appropriately dressed

, made good eye contact and was cooperative.


Behavior: Patient did not exhibit any psychomotor retardation or agitation.


Speech/Language: His speech was spontaneous and normal volume and rhythm and he 

was coherent.


Thought Process: Patient was goal-directed there was no evidence of loose 

association or flight of ideas


Thought Content: Patient denied any auditory or visual hallucinations and no 

paranoid or delusional ideation was elicited.  Patient was much more relaxed 

and reported that his thinking was much clearer on the medication and that he 

felt better on it.  He reported that he was sleeping well and his appetite was 

good.


Suicidal/Homicidal Ideation: Patient denied any current suicidal or homicidal 

ideation


Sensorium/Cognition: Patient was alert and oriented to person, place, and time 

and his recent and remote memory were grossly intact.


Mood/Affect: Patient's mood was less guarded and his affect was appropriate to 

his mood


Insight/Judgment: Patient's insight and judgment are fair





Risk Assessment: Patient's risk for self harm is low should patient continue 

with medications, he has 1 prior suicide attempt and follow-up with outpatient 

care





Discharge Plan: Patient will return to live with his parents, he will continue 

on Abilify 10 mg for 9 more days to complete a 14 day course and he will 

receive his next injection of Abilify Maintenna 300 mg on May 17.  Patient will 

also be given a prescription for melatonin 3 mg at bedtime.  Patient will be 

following up at [facility].  Patient was encouraged to avoid all alcohol 

and drugs and be compliant with follow-up and his medication.





Patient Condition at Discharge: Stable





Plan - Discharge Summary


Discharge Rx Participant: No


New Discharge Prescriptions: 


New


   ARIPiprazole [Abilify Maintena] 300 mg IM QMONTH #1 vial


   Melatonin 3 mg PO HS #28 tablet





Continue


   Pyridoxine [Vitamin B-6] 50 mg PO DAILY


   ARIPiprazole [Abilify] 10 mg PO DAILY #9 tab


Discharge Medication List





Pyridoxine [Vitamin B-6] 50 mg PO DAILY 04/13/18 [History]


ARIPiprazole [Abilify Maintena] 300 mg IM QMONTH #1 vial 04/23/18 [Rx]


ARIPiprazole [Abilify] 10 mg PO DAILY #9 tab 04/23/18 [Rx]


Melatonin 3 mg PO HS #28 tablet 04/23/18 [Rx]








Follow up Appointment(s)/Referral(s): 


Luis E Ignacio [Outside] - 04/30/18 8:30 am (Krystian Chirinos)


Haroldo Eid DO [Primary Care Provider] - 1-2 days


Patient Instructions/Handouts:  How to Stop Smoking (GEN)


Activity/Diet/Wound Care/Special Instructions: 


Keep your follow up appointments as scheduled.  Continue medications as 

prescribed.  No alcohol or street drugs.  No guns or weapons.  Crisis line if 

needed [phone].


Discharge Disposition: HOME SELF-CARE

## 2018-05-02 ENCOUNTER — HOSPITAL ENCOUNTER (INPATIENT)
Dept: HOSPITAL 47 - EC | Age: 28
Discharge: HOME | DRG: 885 | End: 2018-05-02
Attending: PSYCHIATRY & NEUROLOGY | Admitting: PSYCHIATRY & NEUROLOGY
Payer: MEDICARE

## 2018-05-02 VITALS
RESPIRATION RATE: 16 BRPM | SYSTOLIC BLOOD PRESSURE: 109 MMHG | HEART RATE: 102 BPM | DIASTOLIC BLOOD PRESSURE: 69 MMHG | TEMPERATURE: 97.7 F

## 2018-05-02 DIAGNOSIS — F25.1: Primary | ICD-10-CM

## 2018-05-02 DIAGNOSIS — Z79.899: ICD-10-CM

## 2018-05-02 DIAGNOSIS — F17.200: ICD-10-CM

## 2018-05-02 DIAGNOSIS — M41.9: ICD-10-CM

## 2018-05-02 DIAGNOSIS — Z91.410: ICD-10-CM

## 2018-05-02 PROCEDURE — 82075 ASSAY OF BREATH ETHANOL: CPT

## 2018-05-02 PROCEDURE — 99285 EMERGENCY DEPT VISIT HI MDM: CPT

## 2018-05-02 PROCEDURE — 80306 DRUG TEST PRSMV INSTRMNT: CPT
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
## 2018-05-02 NOTE — P.CONS
History of Present Illness





- Reason for Consult


Tachycardia





- History of Present Illness


Patient was admitted for his psychiatric issues clinically doing well is being 

discharged today.  Patient has tachycardia when he came in as she was anxious 

at that time patient sinus rhythm now upon palpation of pulse and heart patient 

heart rate in rhythm or controlled.  Patient denied fever chills nausea 

vomiting abdominal pain dysuria cough runny nose.  Patient is being discharged 

today which is okay from medical perspective





Review of Systems


REVIEW OF SYSTEMS: 


CONSTITUTIONAL: No fever, no malaise, no fatigue. 


HEENT: No recent visual problems or hearing problems. Denied any sore throat. 


CARDIOVASCULAR: No chest pain, orthopnea, PND, no palpitations, no syncope. 


PULMONARY: No shortness of breath, no cough, no hemoptysis. 


GASTROINTESTINAL: No diarrhea, no nausea, no vomiting, no abdominal pain. 

Normoactive bowel sounds. 


NEUROLOGICAL: No headaches, no weakness, no numbness. 


HEMATOLOGICAL: Denies any bleeding or petechiae. 


GENITOURINARY: Denies any burning micturition, frequency, or urgency. 


MUSCULOSKELETAL/RHEUMATOLOGICAL: Denies any joint pain, swelling, or any muscle 

pain. 


ENDOCRINE: Denies any polyuria or polydipsia. 





The rest of the 14-point review of systems is negative.














Past Medical History


Past Medical History: Memory Impairment


Additional Past Medical History / Comment(s): psychosis, alt mental status, 

scoliosis


History of Any Multi-Drug Resistant Organisms: None Reported


Past Surgical History: No Surgical Hx Reported


Past Anesthesia/Blood Transfusion Reactions: No Reported Reaction


Past Psychological History: Anxiety, Schizoaffective Disorder


Smoking Status: Current every day smoker


Past Alcohol Use History: None Reported


Past Drug Use History: None Reported





Medications and Allergies


 Home Medications











 Medication  Instructions  Recorded  Confirmed  Type


 


Melatonin 3 mg PO HS #28 tablet 04/23/18 05/02/18 Rx


 


ARIPiprazole IM [Abilify Maintena] 400 mg IM QMONTH #1 vial 05/02/18  Rx


 


ARIPiprazole [Abilify] 10 mg PO DAILY #14 tab 05/02/18  Rx











 Allergies











Allergy/AdvReac Type Severity Reaction Status Date / Time


 


No Known Allergies Allergy   Verified 04/13/18 20:59














Physical Exam


Vitals: 


 Vital Signs











  Temp Pulse Pulse Resp BP BP Pulse Ox


 


 05/02/18 03:30  97.7 F   102 H  16   109/69  96


 


 05/02/18 03:09   117 H   18  142/88   98


 


 05/02/18 00:39  98.7 F  107 H   20  139/76   98








 Intake and Output











 05/02/18 05/02/18 05/02/18





 06:59 14:59 22:59


 


Other:   


 


  Weight 65.402 kg  














PHYSICAL EXAMINATION: 





GENERAL: The patient is alert and oriented x3, not in any acute distress. Well 

developed, well nourished. 


HEENT: Pupils are round and equally reacting to light. EOMI. No scleral 

icterus. No conjunctival pallor. Normocephalic, atraumatic. No pharyngeal 

erythema. No thyromegaly. 


CARDIOVASCULAR: S1 and S2 present. No murmurs, rubs, or gallops. 


PULMONARY: Chest is clear to auscultation, no wheezing or crackles. 


ABDOMEN: Soft, nontender, nondistended, normoactive bowel sounds. No palpable 

organomegaly. 


MUSCULOSKELETAL: No joint swelling or deformity.


EXTREMITIES: No cyanosis, clubbing, or pedal edema. 


NEUROLOGICAL: Gross neurological examination did not reveal any focal deficits. 


SKIN: No rashes. 





Assessment and Plan


Plan: 


-Tachycardia: Secondary to anxiety episodes which is all at this point of time


-Nicotine use: Counseling was provided


-Anxiety disorder and schizoaffective disorder management as per primary service
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
## 2019-04-07 ENCOUNTER — HOSPITAL ENCOUNTER (EMERGENCY)
Dept: HOSPITAL 47 - EC | Age: 29
Discharge: HOME | End: 2019-04-07
Payer: MEDICARE

## 2019-04-07 VITALS — HEART RATE: 89 BPM | SYSTOLIC BLOOD PRESSURE: 125 MMHG | DIASTOLIC BLOOD PRESSURE: 95 MMHG | TEMPERATURE: 99.8 F

## 2019-04-07 VITALS — RESPIRATION RATE: 18 BRPM
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
## 2019-04-07 DIAGNOSIS — S01.511A: Primary | ICD-10-CM

## 2019-04-07 DIAGNOSIS — S06.9X9A: ICD-10-CM

## 2019-04-07 PROCEDURE — 72125 CT NECK SPINE W/O DYE: CPT

## 2019-04-07 PROCEDURE — 70486 CT MAXILLOFACIAL W/O DYE: CPT

## 2019-04-07 PROCEDURE — 99284 EMERGENCY DEPT VISIT MOD MDM: CPT

## 2019-04-07 PROCEDURE — 12011 RPR F/E/E/N/L/M 2.5 CM/<: CPT

## 2019-04-07 PROCEDURE — 70450 CT HEAD/BRAIN W/O DYE: CPT

## 2019-04-07 NOTE — CT
EXAM:

  CT Maxillofacial Without Intravenous Contrast

 

CLINICAL HISTORY:

  Pain

 

TECHNIQUE:

  Axial computed tomography images of the face without intravenous 

contrast.  CTDI is 10.8 mGy and DLP is 306.3 mGy-cm.  This CT exam was 

performed using one or more of the following dose reduction techniques: 

automated exposure control, adjustment of the mA and/or kV according to 

patient size, and/or use of iterative reconstruction technique.

 

COMPARISON:

  No relevant prior studies available.

 

FINDINGS:

  Bones/joints:  No acute fracture.  Mastoids are clear

  Soft tissues:  Unremarkable.

  Orbits:  Unremarkable.

  Sinuses:  Unremarkable.  No air-fluid levels.

 

IMPRESSION:     

No evidence for fracture the facial bones
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
## 2020-11-30 ENCOUNTER — HOSPITAL ENCOUNTER (EMERGENCY)
Dept: HOSPITAL 47 - EC | Age: 30
Discharge: HOME | End: 2020-11-30
Payer: MEDICARE

## 2020-11-30 VITALS
RESPIRATION RATE: 18 BRPM | HEART RATE: 73 BPM | SYSTOLIC BLOOD PRESSURE: 138 MMHG | TEMPERATURE: 98.1 F | DIASTOLIC BLOOD PRESSURE: 82 MMHG

## 2020-11-30 DIAGNOSIS — F17.290: ICD-10-CM
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
## 2020-11-30 DIAGNOSIS — M67.844: Primary | ICD-10-CM
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
## 2020-11-30 PROCEDURE — 99283 EMERGENCY DEPT VISIT LOW MDM: CPT

## 2020-11-30 NOTE — XR
EXAMINATION TYPE: XR hand complete LT

 

DATE OF EXAM: 11/30/2020

 

COMPARISON: NONE

 

HISTORY: Pain

 

TECHNIQUE: Three views are submitted.

 

FINDINGS:

Lucency involving the distal third digit. Remaining osseous structures are intact.  The joint spaces 
are preserved and there is no acute fracture or dislocation.  

 

IMPRESSION:

1. Lucency involving the tuft of the distal phalanx third digit could be chronic correlate with point
 tenderness to exclude fracture.

## 2020-11-30 NOTE — ED
Upper Extremity HPI





- General


Chief Complaint: Extremity Injury, Upper


Stated Complaint: IHS hand pain


Time Seen by Provider: 11/30/20 07:13


Source: patient


Mode of arrival: ambulatory


Limitations: no limitations





- History of Present Illness


Initial Comments: 


30-year-old male patient presents to the emergency department today for 

evaluation of pain to the left thumb with some numb and tingly feeling.  Patient

states that this started a couple of days ago while he was at work.  Patient 

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  States that last 

evening he had increase in discomfort also had a sharp shooting pain up the 

volar aspect of his arm for the middle of his wrist.  States it occurred twice 

but is resolved.  He denies any known injury.  Denies history of similar 

symptoms.  Denies taking any medication for his symptoms. Patient denies any 

headache, neck pain, back pain, chest pain, shortness of breath, dizziness, 

weakness, abdominal pain, nausea, vomiting, or difficulties with bowel movements

or urination.








- Related Data


                                  Previous Rx's











 Medication  Instructions  Recorded


 


Melatonin 3 mg PO HS #28 tablet 04/23/18


 


ARIPiprazole IM [Abilify Maintena] 400 mg IM QMONTH #1 vial 05/02/18


 


ARIPiprazole [Abilify] 10 mg PO DAILY #14 tab 05/02/18


 


Ibuprofen [Motrin] 600 mg PO Q8HR PRN #30 tab 11/30/20











                                    Allergies











Allergy/AdvReac Type Severity Reaction Status Date / Time


 


No Known Allergies Allergy   Verified 11/30/20 07:08














Review of Systems


ROS Statement: 


Those systems with pertinent positive or pertinent negative responses have been 

documented in the HPI.





ROS Other: All systems not noted in ROS Statement are negative.





Past Medical History


Past Medical History: Memory Impairment


Additional Past Medical History / Comment(s): psychosis, alt mental status, 

scoliosis


History of Any Multi-Drug Resistant Organisms: None Reported


Past Surgical History: No Surgical Hx Reported


Additional Past Surgical History / Comment(s): wisdom teeth


Past Anesthesia/Blood Transfusion Reactions: No Reported Reaction


Past Psychological History: Anxiety, Schizoaffective Disorder


Smoking Status: Vaper


Past Alcohol Use History: Rare


Past Drug Use History: None Reported





General Exam


Limitations: no limitations


General appearance: alert, in no apparent distress, other (Some well-developed, 

well-nourished adult male patient in no acute distress.  Vital signs upon 

presentation are 98.1F, pulse 73, respirations 18, blood pressure 138/82, pulse

 ox 98% on room air.)


Respiratory exam: Present: normal lung sounds bilaterally.  Absent: respiratory 

distress, wheezes, rales, rhonchi, stridor


Cardiovascular Exam: Present: regular rate, normal rhythm, normal heart sounds. 

 Absent: systolic murmur, diastolic murmur, rubs, gallop, clicks


Extremities exam: Present: normal inspection, full ROM, normal capillary refill,

 other (Skin to the left hand is pink, warm, dry.  Cap refills less than 3 

seconds.  Radial pulses 2+ and equal bilaterally.  Full range of motion is 

intact.).  Absent: tenderness, pedal edema, joint swelling, calf tenderness


Neurological exam: Present: alert, oriented X3, CN II-XII intact


Psychiatric exam: Present: normal affect, normal mood


Skin exam: Present: warm, dry, intact, normal color.  Absent: rash





Course


                                   Vital Signs











  11/30/20





  07:05


 


Temperature 98.1 F


 


Pulse Rate 73


 


Respiratory 18





Rate 


 


Blood Pressure 138/82


 


O2 Sat by Pulse 98





Oximetry 














Medical Decision Making





- Medical Decision Making


30-year-old male patient presented to the emergency department today for 

evaluation of painful left hand.  Physical examination is unremarkable.  

Neurovascular status is intact.  X-ray was obtained and showed no acute 

abnormality of the area of pain.  Did show lucency involving the tuft of the 

third digit however patient has no tenderness or complaints of regarding this 

area.  Patient is placed in an Ace wrap.  Discuss tendinitis cause for his 

symptoms.  He'll be put on light duty until he is able to follow-up with 

employee health services.  Return parameters were discussed in detail.  He 

verbalizes understanding and agrees with this plan.








- Radiology Data


Radiology results: report reviewed, image reviewed


3 views of the left hand are obtained.  Report is reviewed in its entirety.  

Impression by Dr. Chavez shows lucency involving the tuft of the distal phalanx

 third digit could be chronic correlate with point tenderness to exclude 

fracture.





Disposition


Clinical Impression: 


 Left hand tendonitis





Disposition: HOME SELF-CARE


Condition: Good


Instructions (If sedation given, give patient instructions):  Tendinitis (ED)


Additional Instructions: 


Rest the hand.  Take anti-inflammatory medication as needed for pain relief.  

Follow-up with orthopedics for further evaluation of symptoms are not improved 

after 1 week.  Follow-up with employee health services as needed.  Return to the

 emergency department immediately for any new, worsening, or concerning 

symptoms.


Prescriptions: 


Ibuprofen [Motrin] 600 mg PO Q8HR PRN #30 tab


 PRN Reason: Pain


Is patient prescribed a controlled substance at d/c from ED?: No


Referrals: 


Haroldo Eid DO [Primary Care Provider] - 1-2 days


Arik Wolf MD [STAFF PHYSICIAN] - 1-2 days


Time of Disposition: 08:17

## 2020-12-03 ENCOUNTER — HOSPITAL ENCOUNTER (OUTPATIENT)
Dept: HOSPITAL 47 - RADXRMAIN | Age: 30
Discharge: HOME | End: 2020-12-03
Payer: COMMERCIAL

## 2020-12-03 DIAGNOSIS — R20.9: ICD-10-CM

## 2020-12-03 DIAGNOSIS — S66.118S: Primary | ICD-10-CM

## 2020-12-03 NOTE — XR
EXAMINATION TYPE: XR wrist complete LT

 

DATE OF EXAM: 12/3/2020

 

CLINICAL HISTORY: Fall injury with pain.

 

TECHNIQUE:  Frontal, lateral and oblique images of the left wrist are obtained.  Fourth scaphoid view
 is performed.

 

COMPARISON: Left hand x-ray dated 11/30/2020.

 

FINDINGS:  There is no acute fracture/dislocation evident in the left wrist.  The joint spaces in the
 left wrist appear within normal limits.  The overlying soft tissue appears unremarkable.

 

IMPRESSION:  There is no acute fracture or dislocation in the left wrist.

## 2021-12-25 ENCOUNTER — HOSPITAL ENCOUNTER (EMERGENCY)
Dept: HOSPITAL 47 - EC | Age: 31
Discharge: HOME | End: 2021-12-25
Payer: COMMERCIAL

## 2021-12-25 VITALS
HEART RATE: 60 BPM | RESPIRATION RATE: 18 BRPM | DIASTOLIC BLOOD PRESSURE: 72 MMHG | SYSTOLIC BLOOD PRESSURE: 135 MMHG | TEMPERATURE: 97.9 F

## 2021-12-25 DIAGNOSIS — Z72.89: ICD-10-CM

## 2021-12-25 DIAGNOSIS — H11.422: Primary | ICD-10-CM

## 2021-12-25 DIAGNOSIS — F41.9: ICD-10-CM

## 2021-12-25 DIAGNOSIS — M41.9: ICD-10-CM

## 2021-12-25 DIAGNOSIS — F17.290: ICD-10-CM

## 2021-12-25 DIAGNOSIS — F25.9: ICD-10-CM

## 2021-12-25 PROCEDURE — 99283 EMERGENCY DEPT VISIT LOW MDM: CPT

## 2021-12-25 PROCEDURE — 96372 THER/PROPH/DIAG INJ SC/IM: CPT

## 2021-12-25 RX ADMIN — DIPHENHYDRAMINE HYDROCHLORIDE STA MG: 50 INJECTION, SOLUTION INTRAMUSCULAR; INTRAVENOUS at 00:46

## 2021-12-25 RX ADMIN — METHYLPREDNISOLONE SODIUM SUCCINATE ONE MG: 125 INJECTION, POWDER, FOR SOLUTION INTRAMUSCULAR; INTRAVENOUS at 00:45

## 2021-12-25 NOTE — ED
Eye Problem HPI





- General


Chief complaint: Eye Problems


Stated complaint: Left Eye Irritation


Time Seen by Provider: 12/25/21 00:27


Source: patient, RN notes reviewed


Mode of arrival: ambulatory


Limitations: no limitations





- History of Present Illness


Initial comments: 





Patient is a 31-year-old male that presents to the emergency department 

complaining of left eye irritation and swelling.  He notes that he tried 

shooting his cat out of bed when actually.  In his eye.  Patient notes that it 

began swelling and became irritated.  Patient denied any change in vision.  He 

was otherwise well-appearing.  Patient notes that he is a very anxious person.  

Patient denied any chest pain shortness of breath headache nausea vomiting 

diarrhea constipation fever fatigue chills.





- Related Data


                                  Previous Rx's











 Medication  Instructions  Recorded


 


Melatonin 3 mg PO HS #28 tablet 04/23/18


 


ARIPiprazole IM [Abilify Maintena] 400 mg IM QMONTH #1 vial 05/02/18


 


ARIPiprazole [Abilify] 10 mg PO DAILY #14 tab 05/02/18


 


Ibuprofen [Motrin] 600 mg PO Q8HR PRN #30 tab 11/30/20


 


Artificial Tears-Hypromellose 1 drops LEFT EYE TID #10 ml 12/25/21





[Artificial Tear Drops]  











                                    Allergies











Allergy/AdvReac Type Severity Reaction Status Date / Time


 


No Known Allergies Allergy   Verified 12/25/21 00:22














Review of Systems


ROS Statement: 


Those systems with pertinent positive or pertinent negative responses have been 

documented in the HPI.





ROS Other: All systems not noted in ROS Statement are negative.





Past Medical History


Past Medical History: Memory Impairment


Additional Past Medical History / Comment(s): psychosis, alt mental status, 

scoliosis


History of Any Multi-Drug Resistant Organisms: None Reported


Past Surgical History: No Surgical Hx Reported


Additional Past Surgical History / Comment(s): wisdom teeth


Past Anesthesia/Blood Transfusion Reactions: No Reported Reaction


Past Psychological History: Anxiety, Schizoaffective Disorder


Smoking Status: Vaper


Past Alcohol Use History: Rare


Past Drug Use History: None Reported





General Exam


Limitations: no limitations


General appearance: alert, in no apparent distress


Head exam: Present: atraumatic, normocephalic, normal inspection


Eye exam: Present: normal appearance, PERRL, EOMI, other (Some lateral left 

conjunctival swelling consistent with chemosis reaction.).  Absent: scleral 

icterus, conjunctival injection, periorbital swelling


ENT exam: Present: normal exam, mucous membranes moist


Neck exam: Present: normal inspection


Respiratory exam: Present: normal lung sounds bilaterally.  Absent: respiratory 

distress, wheezes, rales, rhonchi, stridor


Cardiovascular Exam: Present: regular rate, normal rhythm, normal heart sounds. 

Absent: systolic murmur, diastolic murmur, rubs, gallop, clicks


GI/Abdominal exam: Present: soft, normal bowel sounds.  Absent: distended, 

tenderness, guarding, rebound, rigid


Extremities exam: Present: normal inspection, full ROM, normal capillary refill.

 Absent: tenderness, pedal edema, joint swelling, calf tenderness


Neurological exam: Present: alert, oriented X3


Psychiatric exam: Present: normal affect, normal mood


Skin exam: Present: warm, dry, intact, normal color.  Absent: rash





Course





                                   Vital Signs











  12/25/21





  00:20


 


Temperature 97.9 F


 


Pulse Rate 60


 


Respiratory 18





Rate 


 


Blood Pressure 135/72


 


O2 Sat by Pulse 100





Oximetry 














Medical Decision Making





- Medical Decision Making





31-year-old male with a irritated left eye.


Upon physical exam patient was noted to have chemosis most likely due to cat 

urinating 9 axes.


125 mg of Solu-Medrol, 50 mg of Benadryl ordered.


Patient was informed that this is just a localized ALLERGIC reaction.


Was also informed that he does follow-up with ophthalmology in a day or 2.


Case discussed with Dr. Dunaway





Disposition


Clinical Impression: 


 Chemosis





Disposition: HOME SELF-CARE


Condition: Stable


Instructions (If sedation given, give patient instructions):  Eye Lubricant 

(Into the eye)


Additional Instructions: 


Please return to the Emergency Department if symptoms worsen or any other 

concerns.


Follow-up with primary care 1-2 days.


Use artificial tears as prescribed.


Follow-up with ophthalmologist in the next 1-2 days.


Take Benadryl and Motrin as needed for discomfort and symptom control.





Is patient prescribed a controlled substance at d/c from ED?: No


Referrals: 


Haroldo Eid DO [Primary Care Provider] - 1-2 days


Time of Disposition: 00:39

## 2025-01-20 ENCOUNTER — HOSPITAL ENCOUNTER (EMERGENCY)
Dept: HOSPITAL 47 - EC | Age: 35
LOS: 1 days | Discharge: HOME | End: 2025-01-21
Payer: COMMERCIAL

## 2025-01-20 VITALS — TEMPERATURE: 98.2 F

## 2025-01-20 DIAGNOSIS — S80.02XA: Primary | ICD-10-CM
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
## 2025-01-20 DIAGNOSIS — F25.9: ICD-10-CM

## 2025-01-20 DIAGNOSIS — S80.01XA: ICD-10-CM

## 2025-01-20 DIAGNOSIS — F17.290: ICD-10-CM

## 2025-01-20 DIAGNOSIS — Z23: ICD-10-CM
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
## 2025-01-20 LAB
ALBUMIN SERPL-MCNC: 4.9 G/DL (ref 3.5–5)
ALP SERPL-CCNC: 40 U/L (ref 38–126)
ALT SERPL-CCNC: 47 U/L (ref 4–49)
ANION GAP SERPL CALC-SCNC: 13 MMOL/L
APTT BLD: 20.7 SEC (ref 22–30)
AST SERPL-CCNC: 49 U/L (ref 17–59)
BUN SERPL-SCNC: 6 MG/DL (ref 9–20)
CALCIUM SPEC-MCNC: 9.5 MG/DL (ref 8.4–10.2)
CHLORIDE SERPL-SCNC: 96 MMOL/L (ref 98–107)
CO2 SERPL-SCNC: 28 MMOL/L (ref 22–30)
ERYTHROCYTE [DISTWIDTH] IN BLOOD BY AUTOMATED COUNT: 4.79 M/UL (ref 4.3–5.9)
ERYTHROCYTE [DISTWIDTH] IN BLOOD: 11.8 % (ref 11.5–15.5)
GLUCOSE BLD-MCNC: 130 MG/DL (ref 70–110)
GLUCOSE SERPL-MCNC: 123 MG/DL (ref 74–99)
HCT VFR BLD AUTO: 41.7 % (ref 39–53)
HGB BLD-MCNC: 14.8 GM/DL (ref 13–17.5)
INR PPP: 1 (ref ?–1.2)
MCH RBC QN AUTO: 30.8 PG (ref 25–35)
MCHC RBC AUTO-ENTMCNC: 35.4 G/DL (ref 31–37)
MCV RBC AUTO: 87.1 FL (ref 80–100)
PLATELET # BLD AUTO: 219 K/UL (ref 150–450)
POTASSIUM SERPL-SCNC: 4 MMOL/L (ref 3.5–5.1)
PROT SERPL-MCNC: 7.3 G/DL (ref 6.3–8.2)
PT BLD: 11 SEC (ref 10–12.5)
SODIUM SERPL-SCNC: 137 MMOL/L (ref 137–145)
WBC # BLD AUTO: 8.2 K/UL (ref 3.8–10.6)

## 2025-01-20 PROCEDURE — 70450 CT HEAD/BRAIN W/O DYE: CPT

## 2025-01-20 PROCEDURE — 72170 X-RAY EXAM OF PELVIS: CPT

## 2025-01-20 PROCEDURE — 74177 CT ABD & PELVIS W/CONTRAST: CPT

## 2025-01-20 PROCEDURE — 81003 URINALYSIS AUTO W/O SCOPE: CPT

## 2025-01-20 PROCEDURE — 72125 CT NECK SPINE W/O DYE: CPT

## 2025-01-20 PROCEDURE — 86901 BLOOD TYPING SEROLOGIC RH(D): CPT

## 2025-01-20 PROCEDURE — 80053 COMPREHEN METABOLIC PANEL: CPT

## 2025-01-20 PROCEDURE — 36415 COLL VENOUS BLD VENIPUNCTURE: CPT

## 2025-01-20 PROCEDURE — 80320 DRUG SCREEN QUANTALCOHOLS: CPT

## 2025-01-20 PROCEDURE — 84484 ASSAY OF TROPONIN QUANT: CPT

## 2025-01-20 PROCEDURE — 83605 ASSAY OF LACTIC ACID: CPT

## 2025-01-20 PROCEDURE — 90715 TDAP VACCINE 7 YRS/> IM: CPT

## 2025-01-20 PROCEDURE — 82375 ASSAY CARBOXYHB QUANT: CPT

## 2025-01-20 PROCEDURE — 96360 HYDRATION IV INFUSION INIT: CPT

## 2025-01-20 PROCEDURE — 93005 ELECTROCARDIOGRAM TRACING: CPT

## 2025-01-20 PROCEDURE — 85610 PROTHROMBIN TIME: CPT

## 2025-01-20 PROCEDURE — 71045 X-RAY EXAM CHEST 1 VIEW: CPT

## 2025-01-20 PROCEDURE — 85730 THROMBOPLASTIN TIME PARTIAL: CPT

## 2025-01-20 PROCEDURE — 86850 RBC ANTIBODY SCREEN: CPT

## 2025-01-20 PROCEDURE — 90471 IMMUNIZATION ADMIN: CPT

## 2025-01-20 PROCEDURE — 82550 ASSAY OF CK (CPK): CPT

## 2025-01-20 PROCEDURE — 99291 CRITICAL CARE FIRST HOUR: CPT

## 2025-01-20 PROCEDURE — 80306 DRUG TEST PRSMV INSTRMNT: CPT

## 2025-01-20 PROCEDURE — 85025 COMPLETE CBC W/AUTO DIFF WBC: CPT

## 2025-01-20 PROCEDURE — 86900 BLOOD TYPING SEROLOGIC ABO: CPT

## 2025-01-20 PROCEDURE — 71260 CT THORAX DX C+: CPT

## 2025-01-20 PROCEDURE — 94640 AIRWAY INHALATION TREATMENT: CPT

## 2025-01-20 RX ADMIN — IPRATROPIUM BROMIDE AND ALBUTEROL SULFATE STA ML: .5; 3 SOLUTION RESPIRATORY (INHALATION) at 23:21

## 2025-01-20 RX ADMIN — TETANUS TOXOID, REDUCED DIPHTHERIA TOXOID AND ACELLULAR PERTUSSIS VACCINE, ADSORBED ONE ML: 5; 2.5; 8; 8; 2.5 SUSPENSION INTRAMUSCULAR at 23:30

## 2025-01-20 RX ADMIN — ACETAMINOPHEN STA MG: 500 TABLET ORAL at 23:09

## 2025-01-20 RX ADMIN — CEFAZOLIN STA MLS/HR: 330 INJECTION, POWDER, FOR SOLUTION INTRAMUSCULAR; INTRAVENOUS at 22:50
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
## 2025-01-21 VITALS — SYSTOLIC BLOOD PRESSURE: 120 MMHG | DIASTOLIC BLOOD PRESSURE: 78 MMHG | HEART RATE: 78 BPM

## 2025-01-21 VITALS — RESPIRATION RATE: 16 BRPM

## 2025-01-21 LAB
CELLS COUNTED: 100
EOSINOPHIL # BLD MANUAL: 0.08 K/UL (ref 0–0.7)
LYMPHOCYTES # BLD MANUAL: 2.46 K/UL (ref 1–4.8)
MONOCYTES # BLD MANUAL: 0.25 K/UL (ref 0–1)
NEUTROPHILS NFR BLD MANUAL: 65 %
NEUTS SEG # BLD MANUAL: 5.4 K/UL (ref 1.3–7.7)
PH UR: 7 [PH] (ref 5–8)
SP GR UR: 1.01 (ref 1–1.03)
UROBILINOGEN UR QL STRIP: <2 MG/DL (ref ?–2)

## 2025-01-21 RX ADMIN — POTASSIUM CHLORIDE STA ML: 14.9 INJECTION, SOLUTION INTRAVENOUS at 00:30

## 2025-01-21 RX ADMIN — NICOTINE STA PATCH: 21 PATCH, EXTENDED RELEASE TRANSDERMAL at 00:54
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
